# Patient Record
Sex: MALE | Race: WHITE | NOT HISPANIC OR LATINO | URBAN - METROPOLITAN AREA
[De-identification: names, ages, dates, MRNs, and addresses within clinical notes are randomized per-mention and may not be internally consistent; named-entity substitution may affect disease eponyms.]

---

## 2018-06-18 ENCOUNTER — OFFICE VISIT (OUTPATIENT)
Dept: URBAN - METROPOLITAN AREA CLINIC 33 | Facility: CLINIC | Age: 78
End: 2018-06-18
Payer: COMMERCIAL

## 2018-06-18 DIAGNOSIS — H35.3221 EXDTVE AGE-REL MCLR DEGN, LEFT EYE, WITH ACTV CHRDL NEOVAS: Primary | ICD-10-CM

## 2018-06-18 PROCEDURE — 92134 CPTRZ OPH DX IMG PST SGM RTA: CPT | Performed by: OPHTHALMOLOGY

## 2018-06-18 PROCEDURE — 99213 OFFICE O/P EST LOW 20 MIN: CPT | Performed by: OPHTHALMOLOGY

## 2018-06-18 ASSESSMENT — INTRAOCULAR PRESSURE
OS: 14
OD: 14

## 2018-06-18 NOTE — IMPRESSION/PLAN
Impression: Exudative age-rel mclr degn, right eye, with inactive scar: H35.3213. OD. Condition: established, stable. Vision: vision affected. Plan: Discussed diagnosis in detail with patient. No treatment is required at this time based on exam and OCT. Recommend close observation for now. Will reassess condition in 6  weeks. 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

## 2018-06-18 NOTE — IMPRESSION/PLAN
Impression: Exdtve age-rel mclr degn, left eye, with actv chrdl neovas: H35.3221. OS. Condition: stable. Vision: vision affected. s/p AV OS w/ Dr Rebecca Torres 12/9/2013
s/p AV OS 11/2017 w/ RCA Plan: Discussed diagnosis in detail with patient. Discussed risks of progression with present condition. Based on examination recommend Intravitreal Injection Treatment to  help reduce the fluid and prevent a further reduction in vision although OCT OS appears stable. Discussed the risks and benefits of tx. All questions answered. Patient elects to proceed with recommendation. OCT OS appears stable.

## 2018-06-20 ENCOUNTER — PROCEDURE (OUTPATIENT)
Dept: URBAN - METROPOLITAN AREA CLINIC 33 | Facility: CLINIC | Age: 78
End: 2018-06-20
Payer: COMMERCIAL

## 2018-06-20 PROCEDURE — 67028 INJECTION EYE DRUG: CPT | Performed by: OPHTHALMOLOGY

## 2019-03-04 ENCOUNTER — OFFICE VISIT (OUTPATIENT)
Dept: URBAN - METROPOLITAN AREA CLINIC 33 | Facility: CLINIC | Age: 79
End: 2019-03-04
Payer: COMMERCIAL

## 2019-03-04 DIAGNOSIS — H40.1133 PRIMARY OPEN-ANGLE GLAUCOMA, BILATERAL, SEVERE STAGE: Primary | ICD-10-CM

## 2019-03-04 DIAGNOSIS — H35.3213 EXUDATIVE AGE-REL MCLR DEGN, RIGHT EYE, WITH INACTIVE SCAR: ICD-10-CM

## 2019-03-04 PROCEDURE — 92012 INTRM OPH EXAM EST PATIENT: CPT | Performed by: OPHTHALMOLOGY

## 2019-03-04 ASSESSMENT — INTRAOCULAR PRESSURE
OS: 18
OD: 17

## 2019-03-04 NOTE — IMPRESSION/PLAN
Impression: Primary open-angle glaucoma, bilateral, severe stage: I27.6497. IOP goal mid teens. /469, 2/18 OCT 59 (7) OS only, S/p Av inj OS, S/p Trab HVF 4/18 gen dep OU stable Plan: Discussed diagnosis with patient. Recommend pt continue Latanoprost OU QHS and increase Combigan TID OU, pt has been using BID. Stressed importance of compliance with gtts and regular follow up appointments. 3 month HVF, RNFL OCT and IOP check with Dr Rhiannon Sharp.

## 2019-03-04 NOTE — IMPRESSION/PLAN
Impression: Exudative age-rel mclr degn, right eye, with inactive scar: H35.6483. OD Plan: Discussed diagnosis with patient and need for follow up appointment with [facility].

## 2019-06-10 ENCOUNTER — OFFICE VISIT (OUTPATIENT)
Dept: URBAN - METROPOLITAN AREA CLINIC 33 | Facility: CLINIC | Age: 79
End: 2019-06-10
Payer: COMMERCIAL

## 2019-06-10 PROCEDURE — 99214 OFFICE O/P EST MOD 30 MIN: CPT | Performed by: OPTOMETRIST

## 2019-06-10 PROCEDURE — 92133 CPTRZD OPH DX IMG PST SGM ON: CPT | Performed by: OPTOMETRIST

## 2019-06-10 PROCEDURE — 92004 COMPRE OPH EXAM NEW PT 1/>: CPT | Performed by: OPTOMETRIST

## 2019-06-10 PROCEDURE — 92083 EXTENDED VISUAL FIELD XM: CPT | Performed by: OPTOMETRIST

## 2019-06-10 ASSESSMENT — INTRAOCULAR PRESSURE
OD: 22
OS: 22

## 2019-06-10 NOTE — IMPRESSION/PLAN
Impression: Primary open-angle glaucoma, bilateral, severe stage: D59.7773. IOP goal mid teens. /469, 2/18 OCT 59 (7) OS only, S/p Av inj OS, S/p Trab HVF 4/18 gen dep OU stable IOP high OU Plan: Continue Latanoprost OU QHS, Combigan TID OU. Stressed importance of compliance with gtts and regular follow up appointments. 
orig IOP 19/20comblat high, OCT  /54?(  /59)